# Patient Record
Sex: MALE | Race: WHITE | NOT HISPANIC OR LATINO | Employment: FULL TIME | URBAN - METROPOLITAN AREA
[De-identification: names, ages, dates, MRNs, and addresses within clinical notes are randomized per-mention and may not be internally consistent; named-entity substitution may affect disease eponyms.]

---

## 2022-04-09 ENCOUNTER — OFFICE VISIT (OUTPATIENT)
Dept: URGENT CARE | Facility: URGENT CARE | Age: 37
End: 2022-04-09

## 2022-04-09 VITALS
SYSTOLIC BLOOD PRESSURE: 131 MMHG | RESPIRATION RATE: 18 BRPM | HEART RATE: 89 BPM | TEMPERATURE: 97.9 F | DIASTOLIC BLOOD PRESSURE: 79 MMHG | WEIGHT: 186.2 LBS | OXYGEN SATURATION: 100 %

## 2022-04-09 DIAGNOSIS — R60.1 GENERALIZED EDEMA: ICD-10-CM

## 2022-04-09 DIAGNOSIS — K74.60 CHRONIC HEPATITIS C WITH CIRRHOSIS (H): Primary | ICD-10-CM

## 2022-04-09 DIAGNOSIS — B18.2 CHRONIC HEPATITIS C WITH CIRRHOSIS (H): Primary | ICD-10-CM

## 2022-04-09 DIAGNOSIS — L29.9 GENERALIZED PRURITUS: ICD-10-CM

## 2022-04-09 LAB
ALBUMIN SERPL-MCNC: 2.6 G/DL (ref 3.4–5)
ALBUMIN UR-MCNC: ABNORMAL MG/DL
ALP SERPL-CCNC: 142 U/L (ref 40–150)
ALT SERPL W P-5'-P-CCNC: 70 U/L (ref 0–70)
ANION GAP SERPL CALCULATED.3IONS-SCNC: 3 MMOL/L (ref 3–14)
APPEARANCE UR: CLEAR
AST SERPL W P-5'-P-CCNC: 131 U/L (ref 0–45)
BACTERIA #/AREA URNS HPF: ABNORMAL /HPF
BILIRUB SERPL-MCNC: 3.6 MG/DL (ref 0.2–1.3)
BILIRUB UR QL STRIP: ABNORMAL
BUN SERPL-MCNC: 10 MG/DL (ref 7–30)
CALCIUM SERPL-MCNC: 8.8 MG/DL (ref 8.5–10.1)
CHLORIDE BLD-SCNC: 103 MMOL/L (ref 94–109)
CO2 SERPL-SCNC: 27 MMOL/L (ref 20–32)
COLOR UR AUTO: ABNORMAL
CREAT SERPL-MCNC: 0.84 MG/DL (ref 0.66–1.25)
GFR SERPL CREATININE-BSD FRML MDRD: >90 ML/MIN/1.73M2
GLUCOSE BLD-MCNC: 80 MG/DL (ref 70–99)
GLUCOSE UR STRIP-MCNC: NEGATIVE MG/DL
GRAN CASTS #/AREA URNS LPF: ABNORMAL /LPF
HGB UR QL STRIP: NEGATIVE
KETONES UR STRIP-MCNC: NEGATIVE MG/DL
LEUKOCYTE ESTERASE UR QL STRIP: NEGATIVE
MUCOUS THREADS #/AREA URNS LPF: PRESENT /LPF
NITRATE UR QL: POSITIVE
PH UR STRIP: 5.5 [PH] (ref 5–7)
POTASSIUM BLD-SCNC: 4.4 MMOL/L (ref 3.4–5.3)
PROT SERPL-MCNC: 8.1 G/DL (ref 6.8–8.8)
RBC #/AREA URNS AUTO: ABNORMAL /HPF
SODIUM SERPL-SCNC: 133 MMOL/L (ref 133–144)
SP GR UR STRIP: >=1.03 (ref 1–1.03)
SQUAMOUS #/AREA URNS AUTO: ABNORMAL /LPF
UROBILINOGEN UR STRIP-ACNC: 1 E.U./DL
WBC #/AREA URNS AUTO: ABNORMAL /HPF

## 2022-04-09 PROCEDURE — 81001 URINALYSIS AUTO W/SCOPE: CPT | Performed by: INTERNAL MEDICINE

## 2022-04-09 PROCEDURE — 36415 COLL VENOUS BLD VENIPUNCTURE: CPT | Performed by: INTERNAL MEDICINE

## 2022-04-09 PROCEDURE — 99204 OFFICE O/P NEW MOD 45 MIN: CPT | Performed by: INTERNAL MEDICINE

## 2022-04-09 PROCEDURE — 80053 COMPREHEN METABOLIC PANEL: CPT | Performed by: INTERNAL MEDICINE

## 2022-04-09 RX ORDER — BUPRENORPHINE HYDROCHLORIDE, NALOXONE HYDROCHLORIDE 8; 2 MG/1; MG/1
FILM, SOLUBLE BUCCAL; SUBLINGUAL
COMMUNITY
Start: 2022-03-22

## 2022-04-09 NOTE — PATIENT INSTRUCTIONS
The liver tests are indicating along with your physical exam are indicating that you have developed some liver dysfunction associated with the hepatitis C infection.  If we could biopsy your liver, I'm guessing that we'd see some scarring or cirrhosis.  All of this supports your plan to get going on some assessment with a liver specialist and presumably some antiviral medications.    In the meantime: you can manage fluid by being really restrictive on sodium in your diet.  Strictly avoid any additional salt and avoid foods that come out of a package unless they are specifically marked low sodium.  Avoid fast food and look for low sodium alternatives in restaurants.  Aim to keep sodium intake under 2,000 mg per day.    Drinking water is not a problem with fluid retention; salt is the issue.    I will start you on a diuretic medication to help your body unload some of this fluid.  If you develop signs of low blood pressure (dizziness with standing, weakness) then stop the diuretic medication until you can be reassessed.  I am confident that you do not have a blood clot and can travel safely.

## 2022-04-09 NOTE — PROGRESS NOTES
Assessment & Plan     Chronic hepatitis C with cirrhosis (H)  This patient with known chronic HCV infection by his own history presents today with clinical and biochemical evidence of moderately decompensated cirrhosis.  Although the examination today was focused on his ability to safely travel and get home, the evidence that was gathered is enough to indicate Child's Class C.  Right now the manifestations of cirrhosis include moderate ascites and fluid retention, hypoalbuminemia, hyperbilirubinemia, consequent generalized pruritus and some cutaneous stigmata of liver disease.  The patient's priority was to determine if he would be at risk from travel and, at this point, the net benefit for him is to continue his journey home so he can get into targeted liver care with a specialist and health system who will be following him over the long term.  I recommended that he start dietary sodium restriction and become aware of nutritional needs. I offered to start low dose spironolactone but he declined.  He did take information to discuss with care providers at home.  Also recommended strict avoidance of OTC analgesics.    Generalized edema  Considered differential diagnosis of generalized edema that includes cirrhosis, nephrotic syndrome, congestive heart failure, myxedema.  There was initial concern about DVT given the leg swelling as the principal presenting complaint however this is not the case clinically and we have a firm alternative diagnosis.  Treatment of his fluid retention as above.    - UA reflex to Microscopic - lab collect; Future  - Comprehensive metabolic panel (BMP + Alb, Alk Phos, ALT, AST, Total. Bili, TP); Future  - UA reflex to Microscopic - lab collect  - Comprehensive metabolic panel (BMP + Alb, Alk Phos, ALT, AST, Total. Bili, TP)  - Urine Microscopic Exam    Generalized pruritus  This is a manifestation of his hyperbilirubinemia.    - UA reflex to Microscopic - lab collect; Future  -  "Comprehensive metabolic panel (BMP + Alb, Alk Phos, ALT, AST, Total. Bili, TP); Future  - UA reflex to Microscopic - lab collect  - Comprehensive metabolic panel (BMP + Alb, Alk Phos, ALT, AST, Total. Bili, TP)  - Urine Microscopic Exam    Chalo Parrish MD  [facility]    Subjective     HPI   Chief complaint of leg swelling.  He is known to have hepatitis C but this has been quiet.  He had been traveling recently and is noting some bloating of the abdomen and swelling of the legs. The leg swelling is greater on the left than the right.  Noting as well that he is having some generalized itchiness.  Some new \"red pimple spots\" showing up on the face.  He denies shortness of breath.  Urine has been dark.  He admits to \"not drinking enough water.\"  Has a prior history of IV drug abuse, not currently using.  Denies EtOH.   [place] his primary concern is about having DVT and being safe to travel.  Denies issues with loss of appetite, nausea, changes in mentation.  No chest or abdominal pains. Has a flight that is leaving within the next couple of hours.  Once he gets to [place], [place] in the next couple of days he plans to be seen by MD within the [place] system where his prior healthcare has been.  He intends to pursue hepatitis C and other liver therapies.      Review of Systems   Constitutional, HEENT, cardiovascular, pulmonary, GI, , musculoskeletal, neuro, skin, endocrine and psych systems are negative, except as otherwise noted.      Objective    /79   Pulse 89   Temp 97.9  F (36.6  C)   Resp 18   Wt 86.2 kg (190 lb)   SpO2 100%   There is no height or weight on file to calculate BMI.  Physical Exam   GENERAL APPEARANCE: mildly jaundiced adult male; alert, conversant and coooperative  EYES: mild scleral icterus  NECK: JVP is elevated at 8-9 cm H2O  RESP: lungs clear to auscultation - no rales, rhonchi or wheezes  CV: " regular rates and rhythm, normal S1 S2, no S3 or S4 and no murmur, click or rub  ABDOMEN: moderate distention with some tenderness and a sensation of fullness in the upper right abdomen; hepatomegaly is present with liver span percusses to 7 or 8 cm and a moderately firm but smooth liver edge is palpable 2 - 3 cm below the right costal margin; there is no palpable splenomegaly; bulging flanks are noted along with shifting dullness; cannot appreciate a fluid wave or succussion splash;   EXT: 1-2+ pitting edema of both lower extremities (left > right)  SKIN: no palmar erythema and nail beds are clear; dry excoriation is present on both flanks; several tiny spider angiomata are noted on the face  NEURO: CN 2-12 intact; motor 5/5 throughout; sensation intact; no ataxia on finger-nose-finger; minimal postural tremor with outstretched arms; no asterixis; speech is fluent and mental status is fully intact without evidence of disorientation, memory deficits, fine motor deficits    Results for orders placed or performed in visit on 04/09/22 (from the past 24 hour(s))   UA reflex to Microscopic - lab collect   Result Value Ref Range    Color Urine Olga (A) Colorless, Straw, Light Yellow, Yellow    Appearance Urine Clear Clear    Glucose Urine Negative Negative mg/dL    Bilirubin Urine Moderate (A) Negative    Ketones Urine Negative Negative mg/dL    Specific Gravity Urine >=1.030 1.003 - 1.035    Blood Urine Negative Negative    pH Urine 5.5 5.0 - 7.0    Protein Albumin Urine Trace (A) Negative mg/dL    Urobilinogen Urine 1.0 0.2, 1.0 E.U./dL    Nitrite Urine Positive (A) Negative    Leukocyte Esterase Urine Negative Negative   Urine Microscopic Exam   Result Value Ref Range    Bacteria Urine Few (A) None Seen /HPF    RBC Urine 0-2 0-2 /HPF /HPF    WBC Urine 0-5 0-5 /HPF /HPF    Squamous Epithelials Urine Few (A) None Seen /LPF    Mucus Urine Present (A) None Seen /LPF    Granular Casts Urine 0-2 (A) None Seen /LPF    Comprehensive metabolic panel (BMP + Alb, Alk Phos, ALT, AST, Total. Bili, TP)   Result Value Ref Range    Sodium 133 133 - 144 mmol/L    Potassium 4.4 3.4 - 5.3 mmol/L    Chloride 103 94 - 109 mmol/L    Carbon Dioxide (CO2) 27 20 - 32 mmol/L    Anion Gap 3 3 - 14 mmol/L    Urea Nitrogen 10 7 - 30 mg/dL    Creatinine 0.84 0.66 - 1.25 mg/dL    Calcium 8.8 8.5 - 10.1 mg/dL    Glucose 80 70 - 99 mg/dL    Alkaline Phosphatase 142 40 - 150 U/L     (H) 0 - 45 U/L    ALT 70 0 - 70 U/L    Protein Total 8.1 6.8 - 8.8 g/dL    Albumin 2.6 (L) 3.4 - 5.0 g/dL    Bilirubin Total 3.6 (H) 0.2 - 1.3 mg/dL    GFR Estimate >90 >60 mL/min/1.73m2